# Patient Record
Sex: FEMALE | Race: WHITE | Employment: FULL TIME | URBAN - METROPOLITAN AREA
[De-identification: names, ages, dates, MRNs, and addresses within clinical notes are randomized per-mention and may not be internally consistent; named-entity substitution may affect disease eponyms.]

---

## 2022-11-09 ENCOUNTER — APPOINTMENT (OUTPATIENT)
Dept: CT IMAGING | Age: 27
End: 2022-11-09
Payer: OTHER MISCELLANEOUS

## 2022-11-09 ENCOUNTER — HOSPITAL ENCOUNTER (EMERGENCY)
Age: 27
Discharge: HOME | End: 2022-11-09
Attending: EMERGENCY MEDICINE
Payer: OTHER MISCELLANEOUS

## 2022-11-09 VITALS
DIASTOLIC BLOOD PRESSURE: 96 MMHG | BODY MASS INDEX: 41.78 KG/M2 | TEMPERATURE: 97.9 F | SYSTOLIC BLOOD PRESSURE: 151 MMHG | HEIGHT: 66 IN | RESPIRATION RATE: 14 BRPM | WEIGHT: 260 LBS | HEART RATE: 87 BPM | OXYGEN SATURATION: 99 %
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
## 2022-11-09 DIAGNOSIS — S16.1XXA STRAIN OF NECK MUSCLE, INITIAL ENCOUNTER: ICD-10-CM

## 2022-11-09 DIAGNOSIS — R51.9 ACUTE NONINTRACTABLE HEADACHE, UNSPECIFIED HEADACHE TYPE: ICD-10-CM

## 2022-11-09 PROCEDURE — 72125 CT NECK SPINE W/O DYE: CPT

## 2022-11-09 PROCEDURE — 70450 CT HEAD/BRAIN W/O DYE: CPT

## 2022-11-09 PROCEDURE — 99284 EMERGENCY DEPT VISIT MOD MDM: CPT

## 2022-11-09 RX ORDER — NAPROXEN 500 MG/1
500 TABLET ORAL 2 TIMES DAILY
Qty: 14 TABLET | Refills: 0 | Status: SHIPPED | OUTPATIENT
Start: 2022-11-09

## 2022-11-09 RX ORDER — METHOCARBAMOL 500 MG/1
1000 TABLET, FILM COATED ORAL 3 TIMES DAILY
Qty: 60 TABLET | Refills: 0 | Status: SHIPPED | OUTPATIENT
Start: 2022-11-09 | End: 2022-11-19

## 2022-11-09 RX ORDER — ESCITALOPRAM OXALATE 10 MG/1
10 TABLET ORAL DAILY
COMMUNITY

## 2022-11-09 ASSESSMENT — PAIN DESCRIPTION - LOCATION: LOCATION: HEAD;SHOULDER;NECK

## 2022-11-09 ASSESSMENT — PAIN SCALES - GENERAL: PAINLEVEL_OUTOF10: 3

## 2022-11-09 ASSESSMENT — PAIN - FUNCTIONAL ASSESSMENT: PAIN_FUNCTIONAL_ASSESSMENT: 0-10

## 2022-11-09 NOTE — DISCHARGE INSTRUCTIONS
Return immediately to the emergency department if you experience new or worsened symptoms, chest pain, shortness of breath, abdominal pain, weakness or changes in sensation, progressively worsening headache, changes in vision/speech/swallowing, or for any other concerns.
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
